# Patient Record
(demographics unavailable — no encounter records)

---

## 2024-11-05 NOTE — HISTORY OF PRESENT ILLNESS
[FreeTextEntry1] : Dr. Jorge A Cheatham Cardiologist: Dr. Scott Vizcaino 37 year old man with no medical history is here in the sleep center to address excessive snoring and restless sleep.  Patient is not sleepy with [place] sleepiness score of 7.  Patient has very loud snoring which disturbs his wife, does not have any witnessed apneas.  Patient's bedtime is around 10-11 PM wakes up in the morning around 6.30 AM.   He feels rested when he wakes up.  Patient drinks 2-3 cups of coffee during the daytime. Patient does not have any headaches or nocturia. He is not sleepy while driving. STOPBANG score - 3 neck size - 19 and half inches  9/10/24: Discussed with pt results of HST on 8/19/24 which revealed moderate sleep apnea with an AHI of 15.9 events/hr.  Discussed potential health consequences of untreated sleep apnea including but not limited to HTN, weight gain, fatigue, increased risk of MI, CVA, arrythmias, heart failure and CAD. Recommend CPAP therapy, pt agreeable.   11/5/24: Pt presents to the office for cpap f/u. Pt has benefitted from and been compliant with cpap therapy.  Symptoms on cpap- snoring and headaches resolved, reported increased daytime energy.  download data machine- resmed airsense 11 mask- nasal pillows AHI- 0.4 pressure- 5-20cm (avg 6.6) usage- 6hrs DME: [facility]

## 2024-11-05 NOTE — PHYSICAL EXAM
[General Appearance - Well Developed] : well developed [General Appearance - Well Nourished] : well nourished [General Appearance - In No Acute Distress] : no acute distress [Enlarged Base of the Tongue] : enlargement of the base of the tongue [III] : III [Heart Sounds] : normal S1 and S2 [Murmurs] : no murmurs [] : no respiratory distress [Auscultation Breath Sounds / Voice Sounds] : lungs were clear to auscultation bilaterally [Abnormal Walk] : normal gait [No Focal Deficits] : no focal deficits [Oriented To Time, Place, And Person] : oriented to person, place, and time [Memory Recent] : recent memory was not impaired